# Patient Record
Sex: MALE | Race: WHITE | NOT HISPANIC OR LATINO | URBAN - METROPOLITAN AREA
[De-identification: names, ages, dates, MRNs, and addresses within clinical notes are randomized per-mention and may not be internally consistent; named-entity substitution may affect disease eponyms.]

---

## 2021-01-27 ENCOUNTER — EMERGENCY (EMERGENCY)
Facility: HOSPITAL | Age: 36
LOS: 1 days | Discharge: HOME | End: 2021-01-27
Admitting: EMERGENCY MEDICINE
Payer: COMMERCIAL

## 2021-01-27 VITALS
TEMPERATURE: 98 F | DIASTOLIC BLOOD PRESSURE: 86 MMHG | HEART RATE: 66 BPM | SYSTOLIC BLOOD PRESSURE: 128 MMHG | OXYGEN SATURATION: 99 % | RESPIRATION RATE: 18 BRPM

## 2021-01-27 DIAGNOSIS — Z20.822 CONTACT WITH AND (SUSPECTED) EXPOSURE TO COVID-19: ICD-10-CM

## 2021-01-27 PROCEDURE — 99283 EMERGENCY DEPT VISIT LOW MDM: CPT

## 2021-01-27 NOTE — ED PROVIDER NOTE - OBJECTIVE STATEMENT
36 yo M with no known PMHx, presenting for COVID screening.  Reports no active sx or contact with COVID+ personnels.  Needing Covid swab for upcoming travel. Denies fever, chills, cough, SOB, palpitations, HA, dizziness, congestion, rhinorrhea, N/V/D/C, abdomina pain, change in urinary/bowel function, focal weakness, and malaise.

## 2021-01-27 NOTE — ED PROVIDER NOTE - NS ED ROS FT
GEN - no fever, chills, malaise, weight loss   Skin - no rash, lesions, itch  HEENT - no rhinorrhea, congestion, change in vision/hearing/smell, sore throat, change in voice, neck pain  CV - no chest pain, palpitations, syncope  Resp - no cough, SOB, GAYTAN, wheezing, hemoptysis  GI - no N/V/D/C  MSK - no swelling, joint pain, swelling, myalgia  Hem - No easy bruising or bleeding  Neuro - no HA, dizziness, loss of smell/taste, focal weakness, paresthesia  Psych - no insomonia or mood changes

## 2021-01-27 NOTE — ED PROVIDER NOTE - PATIENT PORTAL LINK FT
You can access the FollowMyHealth Patient Portal offered by [facility] by registering at the following website: [email or web address]. By joining [employer]’s FollowMyHealth portal, you will also be able to view your health information using other applications (apps) compatible with our system.

## 2024-05-22 PROBLEM — Z78.9 OTHER SPECIFIED HEALTH STATUS: Chronic | Status: ACTIVE | Noted: 2021-01-27

## 2024-05-23 ENCOUNTER — APPOINTMENT (OUTPATIENT)
Dept: ORTHOPEDIC SURGERY | Facility: CLINIC | Age: 39
End: 2024-05-23
Payer: COMMERCIAL

## 2024-05-23 VITALS
HEART RATE: 63 BPM | WEIGHT: 180 LBS | BODY MASS INDEX: 22.38 KG/M2 | HEIGHT: 75 IN | DIASTOLIC BLOOD PRESSURE: 81 MMHG | TEMPERATURE: 97 F | OXYGEN SATURATION: 99 % | SYSTOLIC BLOOD PRESSURE: 124 MMHG

## 2024-05-23 DIAGNOSIS — M54.50 LOW BACK PAIN, UNSPECIFIED: ICD-10-CM

## 2024-05-23 PROBLEM — Z00.00 ENCOUNTER FOR PREVENTIVE HEALTH EXAMINATION: Status: ACTIVE | Noted: 2024-05-23

## 2024-05-23 PROCEDURE — 99205 OFFICE O/P NEW HI 60 MIN: CPT

## 2024-05-23 PROCEDURE — 72100 X-RAY EXAM L-S SPINE 2/3 VWS: CPT

## 2024-05-23 RX ORDER — METHYLPREDNISOLONE 4 MG/1
4 TABLET ORAL
Qty: 1 | Refills: 0 | Status: ACTIVE | COMMUNITY
Start: 2024-05-23 | End: 1900-01-01

## 2024-05-23 RX ORDER — TIZANIDINE 4 MG/1
4 TABLET ORAL 3 TIMES DAILY
Qty: 40 | Refills: 2 | Status: ACTIVE | COMMUNITY
Start: 2024-05-23 | End: 1900-01-01

## 2024-05-23 NOTE — PHYSICAL EXAM
[UE/LE] : Sensory: Intact in bilateral upper & lower extremities [Normal DTR Reflexes] : DTR reflexes normal [Normal Proprioception] : sensation intact for proprioception [Normal] : No costovertebral angle tenderness and no spinal tenderness [de-identified] : AP lateral lumbar spine x-ray Ortho PACS 5/23/2024: Likely discrepancy right shorter than left.  Minimal compensatory scoliosis.  Disc base heights maintained.  No evidence of listhesis or spondylosis

## 2024-05-23 NOTE — HISTORY OF PRESENT ILLNESS
[de-identified] : 38-year-old male presents for evaluation of acute onset low back pain.  In early April around the [place] shortly afterwards was on an extended trip to [place] with multiple long flights.  Upon returning he noticed approximately 1 week ago acute onset of severe low back pain and stiffness he rates is approximately 8 out of 10.  This was severe for 3 days and required mostly bedrest has been slowly abating since that time.  He has tried some heat and ibuprofen which is helpful.  He is very difficult to sit and lean forward.  Denies any radiating pain numbness tingling weakness in the extremities.  Works in the film industry and plans sleeping the [place] several months

## 2024-05-23 NOTE — ASSESSMENT
[FreeTextEntry1] : 38-year-old male with acute low back pain likely secondary to myofascial strain spasm versus lumbar herniated disc

## 2024-05-23 NOTE — DISCUSSION/SUMMARY
[de-identified] : I reviewed the natural history of both diagnoses as well as the available treatment options.  I recommended a course of oral steroid and muscle relaxer symptomatically as well as a physical therapy referral for short-term treatment as well as long-term optimization of his lumbar mechanics.  We also discussed that general activity as tolerated as well as heat and ice for symptomatic relief.  He will keep us informed of his progress and understands if symptoms do not improve over the next 1 to 2 weeks may consider an MRI to evaluate for any structural pathology.  I have spent greater than 60 minutes preparing to see the patient, collecting relevant history, performing a thorough history and physical examination, counseling the patient regarding my findings ordering the appropriate therapies and tests, communicating with other relevant healthcare professionals, documenting my encounter and coordinating care.

## 2024-09-12 ENCOUNTER — APPOINTMENT (OUTPATIENT)
Dept: PEDIATRICS | Facility: CLINIC | Age: 39
End: 2024-09-12